# Patient Record
Sex: FEMALE | Race: WHITE | URBAN - METROPOLITAN AREA
[De-identification: names, ages, dates, MRNs, and addresses within clinical notes are randomized per-mention and may not be internally consistent; named-entity substitution may affect disease eponyms.]

---

## 2022-08-29 ENCOUNTER — OFFICE VISIT (OUTPATIENT)
Dept: URBAN - METROPOLITAN AREA CLINIC 51 | Facility: CLINIC | Age: 77
End: 2022-08-29
Payer: MEDICARE

## 2022-08-29 DIAGNOSIS — H25.813 COMBINED FORMS OF AGE-RELATED CATARACT, BILATERAL: ICD-10-CM

## 2022-08-29 DIAGNOSIS — H40.013 OPEN ANGLE WITH BORDERLINE FINDINGS, LOW RISK, BILATERAL: ICD-10-CM

## 2022-08-29 DIAGNOSIS — H43.813 VITREOUS DEGENERATION, BILATERAL: ICD-10-CM

## 2022-08-29 DIAGNOSIS — H52.4 PRESBYOPIA: ICD-10-CM

## 2022-08-29 DIAGNOSIS — H33.322 ROUND HOLE OF RETINA, LEFT EYE: Primary | ICD-10-CM

## 2022-08-29 DIAGNOSIS — H04.123 TEAR FILM INSUFFICIENCY OF BILATERAL LACRIMAL GLANDS: ICD-10-CM

## 2022-08-29 PROCEDURE — 99204 OFFICE O/P NEW MOD 45 MIN: CPT | Performed by: OPTOMETRIST

## 2022-08-29 PROCEDURE — 76514 ECHO EXAM OF EYE THICKNESS: CPT | Performed by: OPTOMETRIST

## 2022-08-29 PROCEDURE — 92133 CPTRZD OPH DX IMG PST SGM ON: CPT | Performed by: OPTOMETRIST

## 2022-08-29 PROCEDURE — 92134 CPTRZ OPH DX IMG PST SGM RTA: CPT | Performed by: OPTOMETRIST

## 2022-08-29 ASSESSMENT — INTRAOCULAR PRESSURE
OS: 16
OD: 16
OD: 22
OS: 15

## 2022-08-29 ASSESSMENT — KERATOMETRY
OS: 42.59
OD: 42.14

## 2022-08-29 ASSESSMENT — VISUAL ACUITY
OS: 20/20
OD: 20/25

## 2022-08-29 NOTE — IMPRESSION/PLAN
Impression: Open angle with borderline findings, low risk, bilateral: H40.013. *IOPs today 16mmHg OU without medication *Pachymetry (08/29/2022) 534/531 *OCT RNFL (08/29/2022) OD: 105um; no thinning OS: 105um; no thinning Plan: Low risk. Based on Anselmo Thacker 74 appearance. Pt discussion regarding diagnosis. Exp'd that it is a symptomless disease. Return to clinic for work up including HVF24-2 and IOP recheck next available. Will review test findings and treatment plan at follow up visit. No gtts given at this time. Pt denies any FOHx.

## 2022-08-29 NOTE — IMPRESSION/PLAN
Impression: Round hole of retina, left eye: H33.322. (NEW) *Pt reports she has been having flashes of light which started 3 weeks ago. Plan: with operculum. Discussed findings with patient. Refer patient to see Retina next available. Warning signs of retinal tear and detachment discussed with patient. To return to clinic STAT if any change in symptoms consistent with RT or RD.

## 2022-08-29 NOTE — IMPRESSION/PLAN
Impression: Tear film insufficiency of bilateral lacrimal glands: H04.123. Plan: Recommend patient to use ATs QID or more OU (from a list of recommended brands of artificial tears, dispensed list for patient to review), preferably preservative free ATs and keeping OU closed directly after instillation for a minimum of 30 seconds. Suggest patient to manage seasonal allergies using OTC medication systemically, using OTC eye gtts such as Zaditor/[id number] BID OU and Ocusoft allergy lid wipes to help with discharge/mucus.  Disp coupons

## 2022-09-13 ENCOUNTER — OFFICE VISIT (OUTPATIENT)
Dept: URBAN - METROPOLITAN AREA CLINIC 51 | Facility: CLINIC | Age: 77
End: 2022-09-13
Payer: MEDICARE

## 2022-09-13 DIAGNOSIS — H33.012 RETINAL DETACHMENT WITH SINGLE BREAK, LEFT EYE: Primary | ICD-10-CM

## 2022-09-13 DIAGNOSIS — H25.813 COMBINED FORMS OF AGE-RELATED CATARACT, BILATERAL: ICD-10-CM

## 2022-09-13 DIAGNOSIS — H43.813 VITREOUS DEGENERATION, BILATERAL: ICD-10-CM

## 2022-09-13 PROCEDURE — 99204 OFFICE O/P NEW MOD 45 MIN: CPT | Performed by: OPHTHALMOLOGY

## 2022-09-13 ASSESSMENT — INTRAOCULAR PRESSURE
OD: 15
OS: 18

## 2022-09-13 NOTE — IMPRESSION/PLAN
Impression: Vitreous degeneration, bilateral: H43.813. Plan:  s/sx RD and tears reviewed. call if any new symptoms develop.

## 2022-09-13 NOTE — IMPRESSION/PLAN
Impression: Combined forms of age-related cataract, bilateral: H25.813.  Plan: moderate, monitor for progression in general clinic

## 2022-09-13 NOTE — IMPRESSION/PLAN
Impression: Retinal detachment with single break, left eye: H33.012. Plan: eval shows superotemporal HST w chronic RD. explained in detail with patient, recommend laser tx. disc r/b/a's, pt elects to proceed in 2 weeks RTC 2 week Laser OS

## 2022-09-27 ENCOUNTER — PROCEDURE (OUTPATIENT)
Dept: URBAN - METROPOLITAN AREA CLINIC 51 | Facility: CLINIC | Age: 77
End: 2022-09-27
Payer: MEDICARE

## 2022-09-27 DIAGNOSIS — H33.012 RETINAL DETACHMENT WITH SINGLE BREAK, LEFT EYE: Primary | ICD-10-CM

## 2022-09-27 PROCEDURE — 67105 REPAIR DETACHED RETINA PC: CPT | Performed by: OPHTHALMOLOGY

## 2022-09-27 NOTE — IMPRESSION/PLAN
Impression: Retinal detachment with single break, left eye: H33.012.  Plan: laser done OS


RTC 1 month

## 2022-10-18 ENCOUNTER — OFFICE VISIT (OUTPATIENT)
Dept: URBAN - METROPOLITAN AREA CLINIC 51 | Facility: CLINIC | Age: 77
End: 2022-10-18
Payer: MEDICARE

## 2022-10-18 DIAGNOSIS — H40.013 OPEN ANGLE WITH BORDERLINE FINDINGS, LOW RISK, BILATERAL: Primary | ICD-10-CM

## 2022-10-18 PROCEDURE — 92083 EXTENDED VISUAL FIELD XM: CPT | Performed by: OPTOMETRIST

## 2022-10-18 PROCEDURE — 99213 OFFICE O/P EST LOW 20 MIN: CPT | Performed by: OPTOMETRIST

## 2022-10-18 ASSESSMENT — INTRAOCULAR PRESSURE
OS: 17
OD: 18

## 2022-10-18 NOTE — IMPRESSION/PLAN
Impression: Open angle with borderline findings, low risk, bilateral: H40.013. *IOPs today 18mmHg OD and 17mmHg OS without medication *Pachymetry (08/29/2022) 534/531 *OCT RNFL (08/29/2022) OD: 105um; no thinning OS: 105um; no thinning *HVF 24-2 (10/18/22): OD: scattered defects OS: binasal defect Plan: Low risk. Based on Anselmo Thacker 74 appearance. Reviewed HVF 24-2, not associated RNFL. IOPs are in good range. For now, monitor. 
Recheck IOP and HVF in 6 months

## 2022-10-25 ENCOUNTER — OFFICE VISIT (OUTPATIENT)
Dept: URBAN - METROPOLITAN AREA CLINIC 51 | Facility: CLINIC | Age: 77
End: 2022-10-25
Payer: MEDICARE

## 2022-10-25 DIAGNOSIS — H33.012 RETINAL DETACHMENT WITH SINGLE BREAK, LEFT EYE: Primary | ICD-10-CM

## 2022-10-25 PROCEDURE — 99214 OFFICE O/P EST MOD 30 MIN: CPT | Performed by: OPHTHALMOLOGY

## 2022-10-25 ASSESSMENT — INTRAOCULAR PRESSURE
OS: 15
OD: 12

## 2022-10-25 NOTE — IMPRESSION/PLAN
Impression: Retinal detachment with single break, left eye: H33.012. Plan: s/p laser to tear w chronic RD OS, may require surgical intervention. will reeval in 2 weeks RTC 2 weeks

## 2022-11-08 ENCOUNTER — OFFICE VISIT (OUTPATIENT)
Dept: URBAN - METROPOLITAN AREA CLINIC 51 | Facility: CLINIC | Age: 77
End: 2022-11-08
Payer: MEDICARE

## 2022-11-08 DIAGNOSIS — H43.813 VITREOUS DEGENERATION, BILATERAL: ICD-10-CM

## 2022-11-08 DIAGNOSIS — H25.813 COMBINED FORMS OF AGE-RELATED CATARACT, BILATERAL: ICD-10-CM

## 2022-11-08 DIAGNOSIS — H33.012 RETINAL DETACHMENT WITH SINGLE BREAK, LEFT EYE: Primary | ICD-10-CM

## 2022-11-08 PROCEDURE — 99214 OFFICE O/P EST MOD 30 MIN: CPT | Performed by: OPHTHALMOLOGY

## 2022-11-08 ASSESSMENT — INTRAOCULAR PRESSURE
OD: 16
OS: 15

## 2022-11-08 NOTE — IMPRESSION/PLAN
Impression: Retinal detachment with single break, left eye: H33.012. Plan: s/p laser to tear w chronic RD OS. stable from prior. will cont to monitor for now RTC 1 month

## 2022-12-06 ENCOUNTER — OFFICE VISIT (OUTPATIENT)
Dept: URBAN - METROPOLITAN AREA CLINIC 51 | Facility: CLINIC | Age: 77
End: 2022-12-06
Payer: MEDICARE

## 2022-12-06 DIAGNOSIS — H33.012 RETINAL DETACHMENT WITH SINGLE BREAK, LEFT EYE: Primary | ICD-10-CM

## 2022-12-06 DIAGNOSIS — H43.813 VITREOUS DEGENERATION, BILATERAL: ICD-10-CM

## 2022-12-06 DIAGNOSIS — H25.813 COMBINED FORMS OF AGE-RELATED CATARACT, BILATERAL: ICD-10-CM

## 2022-12-06 PROCEDURE — 67105 REPAIR DETACHED RETINA PC: CPT | Performed by: OPHTHALMOLOGY

## 2022-12-06 PROCEDURE — 99214 OFFICE O/P EST MOD 30 MIN: CPT | Performed by: OPHTHALMOLOGY

## 2022-12-06 ASSESSMENT — INTRAOCULAR PRESSURE
OS: 11
OD: 11

## 2022-12-06 NOTE — IMPRESSION/PLAN
NP for review.    Impression: Retinal detachment with single break, left eye: H33.012.  Plan: s/p laser to tear w chronic RD OS. minimal laser scar formation on posterior edge, recommend additional laser tx. disc r/b/a's, pt elects to proceed TODAY

RTC 6 weeks

## 2023-01-04 ENCOUNTER — OFFICE VISIT (OUTPATIENT)
Dept: URBAN - METROPOLITAN AREA CLINIC 44 | Facility: CLINIC | Age: 78
End: 2023-01-04
Payer: MEDICARE

## 2023-01-04 PROCEDURE — 99214 OFFICE O/P EST MOD 30 MIN: CPT

## 2023-01-04 ASSESSMENT — INTRAOCULAR PRESSURE
OS: 16
OD: 14

## 2023-01-04 NOTE — IMPRESSION/PLAN
Impression: Retinal detachment with single break, left eye: H33.012. Plan: Mac off. Retina detached from 1-6 o'clock temporally secondary to small horseshoe tear sup temp. DWP that retina surgery is required to reattach the retina. Advised pt that prognosis is guarded given the mac-off nature. Recommend surgery next 2-3 days to reattach retina.

## 2023-01-05 ENCOUNTER — ADULT PHYSICAL (OUTPATIENT)
Dept: URBAN - METROPOLITAN AREA CLINIC 44 | Facility: CLINIC | Age: 78
End: 2023-01-05
Payer: MEDICARE

## 2023-01-05 DIAGNOSIS — H33.012 RETINAL DETACHMENT WITH SINGLE BREAK, LEFT EYE: ICD-10-CM

## 2023-01-05 DIAGNOSIS — Z01.818 ENCOUNTER FOR OTHER PREPROCEDURAL EXAMINATION: Primary | ICD-10-CM

## 2023-01-05 PROCEDURE — 99203 OFFICE O/P NEW LOW 30 MIN: CPT | Performed by: PHYSICIAN ASSISTANT

## 2023-01-07 ENCOUNTER — POST-OPERATIVE VISIT (OUTPATIENT)
Dept: URBAN - METROPOLITAN AREA CLINIC 10 | Facility: CLINIC | Age: 78
End: 2023-01-07

## 2023-01-07 DIAGNOSIS — Z48.810 ENCOUNTER FOR SURGICAL AFTERCARE FOLLOWING SURGERY ON A SENSE ORGAN: Primary | ICD-10-CM

## 2023-01-07 PROCEDURE — 99024 POSTOP FOLLOW-UP VISIT: CPT

## 2023-01-07 RX ORDER — OFLOXACIN 3 MG/ML
0.3 % SOLUTION/ DROPS OPHTHALMIC
Qty: 5 | Refills: 1 | Status: ACTIVE
Start: 2023-01-07

## 2023-01-07 ASSESSMENT — INTRAOCULAR PRESSURE: OS: 16

## 2023-01-07 NOTE — IMPRESSION/PLAN
Impression:  Encounter for surgical aftercare following surgery on a sense organ  Z48.810. Plan: Pt doing well. Reviewed post-op instructions, including drops. RTC as scheduled, sooner PRN.

## 2023-01-20 ENCOUNTER — OFFICE VISIT (OUTPATIENT)
Dept: URBAN - METROPOLITAN AREA CLINIC 10 | Facility: CLINIC | Age: 78
End: 2023-01-20
Payer: MEDICARE

## 2023-01-20 DIAGNOSIS — Z48.810 ENCOUNTER FOR SURGICAL AFTERCARE FOLLOWING SURGERY ON A SENSE ORGAN: Primary | ICD-10-CM

## 2023-01-20 DIAGNOSIS — H33.012 RETINAL DETACHMENT WITH SINGLE BREAK, LEFT EYE: ICD-10-CM

## 2023-01-20 PROCEDURE — 92134 CPTRZ OPH DX IMG PST SGM RTA: CPT | Performed by: OPHTHALMOLOGY

## 2023-01-20 PROCEDURE — 99024 POSTOP FOLLOW-UP VISIT: CPT | Performed by: OPHTHALMOLOGY

## 2023-01-20 ASSESSMENT — INTRAOCULAR PRESSURE
OD: 12
OS: 12

## 2023-01-20 NOTE — IMPRESSION/PLAN
Impression: Retinal detachment with single break, left eye: H33.012. Plan: the exam and OCT show that the patients retina is flat and attached 360 degrees w/ laser. The patient will RTC in 2 months in the [place] clinic for a dilated follow-up and possible OCT. The patient will obtain an new refraction for glasses.

## 2023-04-20 ENCOUNTER — OFFICE VISIT (OUTPATIENT)
Dept: URBAN - METROPOLITAN AREA CLINIC 51 | Facility: CLINIC | Age: 78
End: 2023-04-20
Payer: MEDICARE

## 2023-04-20 DIAGNOSIS — H40.013 OPEN ANGLE WITH BORDERLINE FINDINGS, LOW RISK, BILATERAL: ICD-10-CM

## 2023-04-20 DIAGNOSIS — H33.012 RETINAL DETACHMENT WITH SINGLE BREAK, LEFT EYE: ICD-10-CM

## 2023-04-20 DIAGNOSIS — H25.813 COMBINED FORMS OF AGE-RELATED CATARACT, BILATERAL: Primary | ICD-10-CM

## 2023-04-20 DIAGNOSIS — H52.4 PRESBYOPIA: ICD-10-CM

## 2023-04-20 DIAGNOSIS — H02.834 DERMATOCHALASIS OF LEFT UPPER EYELID: ICD-10-CM

## 2023-04-20 DIAGNOSIS — H02.831 DERMATOCHALASIS OF RIGHT UPPER EYELID: ICD-10-CM

## 2023-04-20 DIAGNOSIS — H04.123 TEAR FILM INSUFFICIENCY OF BILATERAL LACRIMAL GLANDS: ICD-10-CM

## 2023-04-20 DIAGNOSIS — H43.813 VITREOUS DEGENERATION, BILATERAL: ICD-10-CM

## 2023-04-20 PROCEDURE — 92083 EXTENDED VISUAL FIELD XM: CPT | Performed by: OPTOMETRIST

## 2023-04-20 PROCEDURE — 99214 OFFICE O/P EST MOD 30 MIN: CPT | Performed by: OPTOMETRIST

## 2023-04-20 PROCEDURE — 92134 CPTRZ OPH DX IMG PST SGM RTA: CPT | Performed by: OPTOMETRIST

## 2023-04-20 ASSESSMENT — INTRAOCULAR PRESSURE
OS: 16
OD: 14

## 2023-04-20 NOTE — IMPRESSION/PLAN
Impression: Tear film insufficiency of bilateral lacrimal glands: H04.123. Plan: Recommend patient to use ATs QID or more OU (from a list of recommended brands of artificial tears, dispensed list for patient to review), preferably preservative free ATs and keeping OU closed directly after instillation for a minimum of 30 seconds. Reviewed dry eyes can affect consistent vision after cataract surgery. Stressed management with daily use of artificial tears from a list of preferred brands to minimize fluctuating vision that can affect visual outcomes.

## 2023-04-20 NOTE — IMPRESSION/PLAN
Impression: Retinal detachment with single break, left eye: H33.012. *s/p repair OS  Plan: Warning signs of retinal tear and detachment discussed with patient. To return to clinic STAT if any change in symptoms consistent with RT or RD.

## 2023-04-20 NOTE — IMPRESSION/PLAN
Impression: Open angle with borderline findings, low risk, bilateral: H40.013. *IOPs today 14mmHg OD and 16mmHg OS without medication *Pachymetry (08/29/2022) 534/531 *OCT RNFL (08/29/2022) OD: 105um; no thinning OS: 105um; no thinning *HVF 24-2 (04/20/20230 OD: moderate binasal step; enlarged blind spot superior/inferior OS: mild binasal step; inferior/superior defects *HVF 24-2 (10/18/22): OD: scattered defects OS: binasal defect Plan: I counseled the patient regarding the following: Glaucoma suspects do not usually need to be treated but do need close follow up monitoring for early signs of glaucoma damage. Some glaucoma suspects require treatment, and the same medical options for glaucoma eye drops are utilized. Laser procedures and intraocular surgery are usually reserved for patients with uncontrolled glaucoma. Glaucoma suspects may also include individuals with other risk factors such as a family history of glaucoma, temporary elevations of IOP when taking steroid medications, pseudoexfoliation syndrome, pigmentary dispersion syndrome, thin central corneal thickness, and optic disc hemorrhages. After review of tests, it is reasonable to monitor without treatment.

## 2023-04-20 NOTE — IMPRESSION/PLAN
Impression: Vitreous degeneration, bilateral: H43.813. *s/p vitrectomy OS  Plan: No vitreous cells, retinal tears/holes, or detachments observed today. Patient to RTC immediately if notice sudden onset or worsening flashing lights, floaters, or a curtain over vision.

## 2023-04-20 NOTE — IMPRESSION/PLAN
Impression: Combined forms of age-related cataract, bilateral: H25.813. Cataract causing symptomatic impairment of visual function not correctable with a tolerable change in glasses or contact lenses resulting in the patient's inability to function satisfactorily while performing Activities of Daily Life including, but not limited to reading, viewing television, driving, or meeting vocational or recreational needs. Plan:  Discussed cataracts with patient. Discussed treatment options. Surgical treatment is recommended. Surgical risks and benefits discussed. Patient elects surgical treatment. Recommend surgery OU, OS first. Aim OD: plano. Aim OS: plano. Patient will need glasses for all near work, including computer. Patient not a candidate for Specialty IOL due to history of retina detachment OS (s/p repair OS) and GWEN OU.

## 2023-07-20 ENCOUNTER — ADULT PHYSICAL (OUTPATIENT)
Dept: URBAN - METROPOLITAN AREA CLINIC 51 | Facility: CLINIC | Age: 78
End: 2023-07-20
Payer: MEDICARE

## 2023-07-20 DIAGNOSIS — H25.813 COMBINED FORMS OF AGE-RELATED CATARACT, BILATERAL: Primary | ICD-10-CM

## 2023-07-20 DIAGNOSIS — Z01.818 ENCOUNTER FOR OTHER PREPROCEDURAL EXAMINATION: Primary | ICD-10-CM

## 2023-07-20 PROCEDURE — 99213 OFFICE O/P EST LOW 20 MIN: CPT | Performed by: PHYSICIAN ASSISTANT

## 2023-07-20 RX ORDER — LISINOPRIL 20 MG/1
20 MG TABLET ORAL
Qty: 0 | Refills: 0 | Status: ACTIVE
Start: 2023-07-20

## 2023-07-28 ENCOUNTER — PRE-OPERATIVE VISIT (OUTPATIENT)
Dept: URBAN - METROPOLITAN AREA CLINIC 44 | Facility: CLINIC | Age: 78
End: 2023-07-28
Payer: MEDICARE

## 2023-07-28 DIAGNOSIS — H40.013 OPEN ANGLE WITH BORDERLINE FINDINGS, LOW RISK, BILATERAL: ICD-10-CM

## 2023-07-28 DIAGNOSIS — H02.831 DERMATOCHALASIS OF RIGHT UPPER EYELID: ICD-10-CM

## 2023-07-28 DIAGNOSIS — C80.1 CANCER: ICD-10-CM

## 2023-07-28 DIAGNOSIS — H25.813 COMBINED FORMS OF AGE-RELATED CATARACT, BILATERAL: Primary | ICD-10-CM

## 2023-07-28 DIAGNOSIS — H33.012 RETINAL DETACHMENT WITH SINGLE BREAK, LEFT EYE: ICD-10-CM

## 2023-07-28 DIAGNOSIS — H43.813 VITREOUS DEGENERATION, BILATERAL: ICD-10-CM

## 2023-07-28 DIAGNOSIS — H04.123 TEAR FILM INSUFFICIENCY OF BILATERAL LACRIMAL GLANDS: ICD-10-CM

## 2023-07-28 DIAGNOSIS — H02.834 DERMATOCHALASIS OF LEFT UPPER EYELID: ICD-10-CM

## 2023-07-28 DIAGNOSIS — H52.203 BILATERAL ASTIGMATISM: ICD-10-CM

## 2023-07-28 PROCEDURE — 99214 OFFICE O/P EST MOD 30 MIN: CPT | Performed by: OPHTHALMOLOGY

## 2023-07-28 PROCEDURE — 92136 OPHTHALMIC BIOMETRY: CPT | Performed by: OPHTHALMOLOGY

## 2023-07-28 PROCEDURE — 92020 GONIOSCOPY: CPT | Performed by: OPHTHALMOLOGY

## 2023-07-28 RX ORDER — PREDNISOLONE ACETATE 10 MG/ML
1 % SUSPENSION/ DROPS OPHTHALMIC
Qty: 5 | Refills: 2 | Status: ACTIVE
Start: 2023-07-28

## 2023-07-28 RX ORDER — DICLOFENAC SODIUM 1 MG/ML
0.1 % SOLUTION/ DROPS OPHTHALMIC
Qty: 5 | Refills: 2 | Status: ACTIVE
Start: 2023-07-28

## 2023-07-28 ASSESSMENT — INTRAOCULAR PRESSURE
OS: 16
OD: 16

## 2023-08-03 ENCOUNTER — SURGERY (OUTPATIENT)
Dept: URBAN - METROPOLITAN AREA SURGERY 19 | Facility: SURGERY | Age: 78
End: 2023-08-03
Payer: MEDICARE

## 2023-08-03 DIAGNOSIS — H25.813 COMBINED FORMS OF AGE-RELATED CATARACT, BILATERAL: Primary | ICD-10-CM

## 2023-08-03 PROCEDURE — 66984 XCAPSL CTRC RMVL W/O ECP: CPT | Performed by: OPHTHALMOLOGY

## 2023-08-04 ENCOUNTER — POST-OPERATIVE VISIT (OUTPATIENT)
Dept: URBAN - METROPOLITAN AREA CLINIC 51 | Facility: CLINIC | Age: 78
End: 2023-08-04
Payer: MEDICARE

## 2023-08-04 DIAGNOSIS — Z48.810 ENCOUNTER FOR SURGICAL AFTERCARE FOLLOWING SURGERY ON A SENSE ORGAN: Primary | ICD-10-CM

## 2023-08-04 PROCEDURE — 99024 POSTOP FOLLOW-UP VISIT: CPT | Performed by: OPTOMETRIST

## 2023-08-04 ASSESSMENT — INTRAOCULAR PRESSURE: OS: 16

## 2023-08-11 ENCOUNTER — POST-OPERATIVE VISIT (OUTPATIENT)
Dept: URBAN - METROPOLITAN AREA CLINIC 51 | Facility: CLINIC | Age: 78
End: 2023-08-11
Payer: MEDICARE

## 2023-08-11 DIAGNOSIS — Z48.810 ENCOUNTER FOR SURGICAL AFTERCARE FOLLOWING SURGERY ON A SENSE ORGAN: ICD-10-CM

## 2023-08-11 DIAGNOSIS — H52.03 HYPERMETROPIA, BILATERAL: Primary | ICD-10-CM

## 2023-08-11 PROCEDURE — 99024 POSTOP FOLLOW-UP VISIT: CPT | Performed by: OPTOMETRIST

## 2023-08-11 ASSESSMENT — VISUAL ACUITY
OS: 20/25
OD: 20/25

## 2023-08-11 ASSESSMENT — INTRAOCULAR PRESSURE
OD: 16
OS: 15

## 2023-08-17 ENCOUNTER — SURGERY (OUTPATIENT)
Dept: URBAN - METROPOLITAN AREA SURGERY 19 | Facility: SURGERY | Age: 78
End: 2023-08-17
Payer: MEDICARE

## 2023-08-17 DIAGNOSIS — H25.811 COMBINED FORMS OF AGE-RELATED CATARACT, RIGHT EYE: Primary | ICD-10-CM

## 2023-08-17 DIAGNOSIS — H52.203 UNSPECIFIED ASTIGMATISM, BILATERAL: ICD-10-CM

## 2023-08-17 PROCEDURE — 66984 XCAPSL CTRC RMVL W/O ECP: CPT | Performed by: OPHTHALMOLOGY

## 2023-08-18 ENCOUNTER — POST-OPERATIVE VISIT (OUTPATIENT)
Dept: URBAN - METROPOLITAN AREA CLINIC 51 | Facility: CLINIC | Age: 78
End: 2023-08-18
Payer: MEDICARE

## 2023-08-18 DIAGNOSIS — Z96.1 PRESENCE OF INTRAOCULAR LENS: Primary | ICD-10-CM

## 2023-08-18 PROCEDURE — 99024 POSTOP FOLLOW-UP VISIT: CPT | Performed by: OPTOMETRIST

## 2023-08-18 ASSESSMENT — INTRAOCULAR PRESSURE: OD: 16

## 2023-09-14 ENCOUNTER — POST-OPERATIVE VISIT (OUTPATIENT)
Dept: URBAN - METROPOLITAN AREA CLINIC 51 | Facility: CLINIC | Age: 78
End: 2023-09-14
Payer: MEDICARE

## 2023-09-14 DIAGNOSIS — H52.03 HYPERMETROPIA, BILATERAL: Primary | ICD-10-CM

## 2023-09-14 DIAGNOSIS — Z48.810 ENCOUNTER FOR SURGICAL AFTERCARE FOLLOWING SURGERY ON A SENSE ORGAN: ICD-10-CM

## 2023-09-14 PROCEDURE — 99024 POSTOP FOLLOW-UP VISIT: CPT | Performed by: OPTOMETRIST

## 2023-09-14 ASSESSMENT — INTRAOCULAR PRESSURE
OD: 15
OS: 15

## 2023-09-14 ASSESSMENT — VISUAL ACUITY
OD: 20/20
OS: 20/25

## 2024-03-14 ENCOUNTER — OFFICE VISIT (OUTPATIENT)
Dept: URBAN - METROPOLITAN AREA CLINIC 51 | Facility: CLINIC | Age: 79
End: 2024-03-14
Payer: MEDICARE

## 2024-03-14 DIAGNOSIS — H40.013 OPEN ANGLE WITH BORDERLINE FINDINGS, LOW RISK, BILATERAL: ICD-10-CM

## 2024-03-14 DIAGNOSIS — H04.123 TEAR FILM INSUFFICIENCY OF BILATERAL LACRIMAL GLANDS: ICD-10-CM

## 2024-03-14 DIAGNOSIS — H43.813 VITREOUS DEGENERATION, BILATERAL: Primary | ICD-10-CM

## 2024-03-14 PROCEDURE — 92134 CPTRZ OPH DX IMG PST SGM RTA: CPT | Performed by: OPTOMETRIST

## 2024-03-14 PROCEDURE — 99214 OFFICE O/P EST MOD 30 MIN: CPT | Performed by: OPTOMETRIST

## 2024-03-14 PROCEDURE — 92133 CPTRZD OPH DX IMG PST SGM ON: CPT | Performed by: OPTOMETRIST

## 2024-03-14 ASSESSMENT — INTRAOCULAR PRESSURE
OS: 14
OD: 14

## 2024-03-14 ASSESSMENT — VISUAL ACUITY
OD: 20/20
OS: 20/25

## 2024-03-14 ASSESSMENT — KERATOMETRY
OS: 42.63
OD: 42.25

## 2025-03-18 ENCOUNTER — OFFICE VISIT (OUTPATIENT)
Dept: URBAN - METROPOLITAN AREA CLINIC 51 | Facility: CLINIC | Age: 80
End: 2025-03-18
Payer: MEDICARE

## 2025-03-18 DIAGNOSIS — H40.013 OPEN ANGLE WITH BORDERLINE FINDINGS, LOW RISK, BILATERAL: ICD-10-CM

## 2025-03-18 DIAGNOSIS — H33.012 RETINAL DETACHMENT WITH SINGLE BREAK, LEFT EYE: ICD-10-CM

## 2025-03-18 DIAGNOSIS — H43.813 VITREOUS DEGENERATION, BILATERAL: ICD-10-CM

## 2025-03-18 DIAGNOSIS — H26.492 OTHER SECONDARY CATARACT, LEFT EYE: Primary | ICD-10-CM

## 2025-03-18 DIAGNOSIS — H04.123 TEAR FILM INSUFFICIENCY OF BILATERAL LACRIMAL GLANDS: ICD-10-CM

## 2025-03-18 PROCEDURE — 92134 CPTRZ OPH DX IMG PST SGM RTA: CPT | Performed by: OPTOMETRIST

## 2025-03-18 PROCEDURE — 92014 COMPRE OPH EXAM EST PT 1/>: CPT | Performed by: OPTOMETRIST

## 2025-03-18 PROCEDURE — 92133 CPTRZD OPH DX IMG PST SGM ON: CPT | Performed by: OPTOMETRIST

## 2025-03-18 ASSESSMENT — VISUAL ACUITY
OD: 20/20
OS: 20/25

## 2025-03-18 ASSESSMENT — INTRAOCULAR PRESSURE
OD: 12
OS: 13

## 2025-03-18 ASSESSMENT — KERATOMETRY
OS: 42.75
OD: 42.50